# Patient Record
Sex: FEMALE | Race: WHITE | URBAN - NONMETROPOLITAN AREA
[De-identification: names, ages, dates, MRNs, and addresses within clinical notes are randomized per-mention and may not be internally consistent; named-entity substitution may affect disease eponyms.]

---

## 2022-10-27 ENCOUNTER — OFFICE VISIT (OUTPATIENT)
Dept: PRIMARY CARE CLINIC | Age: 52
End: 2022-10-27
Payer: COMMERCIAL

## 2022-10-27 VITALS
SYSTOLIC BLOOD PRESSURE: 122 MMHG | WEIGHT: 188.2 LBS | TEMPERATURE: 97.5 F | HEART RATE: 60 BPM | DIASTOLIC BLOOD PRESSURE: 72 MMHG | OXYGEN SATURATION: 98 % | RESPIRATION RATE: 18 BRPM

## 2022-10-27 DIAGNOSIS — Z11.52 ENCOUNTER FOR SCREENING FOR COVID-19: ICD-10-CM

## 2022-10-27 DIAGNOSIS — R11.0 NAUSEA: Primary | ICD-10-CM

## 2022-10-27 LAB
INFLUENZA A ANTIBODY: NEGATIVE
INFLUENZA B ANTIBODY: NEGATIVE
SARS-COV-2, PCR: NOT DETECTED

## 2022-10-27 PROCEDURE — 99212 OFFICE O/P EST SF 10 MIN: CPT | Performed by: NURSE PRACTITIONER

## 2022-10-27 PROCEDURE — 87804 INFLUENZA ASSAY W/OPTIC: CPT | Performed by: NURSE PRACTITIONER

## 2022-10-27 ASSESSMENT — ENCOUNTER SYMPTOMS
DIARRHEA: 0
VOMITING: 0
ALLERGIC/IMMUNOLOGIC NEGATIVE: 1
RESPIRATORY NEGATIVE: 1
EYES NEGATIVE: 1
NAUSEA: 1
SINUS PRESSURE: 0
SINUS PAIN: 0
SORE THROAT: 0

## 2022-10-27 NOTE — PROGRESS NOTES
Darío Garcia (:  1970) is a 47 y.o. female,Established patient, here for evaluation of the following chief complaint(s):  Fever and Nausea    Patient presents today complaining of fever and nausea, body aches, fatigue, chills, headache that began this morning. Denies cough, vomiting or diarrhea. Discussed with patient her flu test was negative. Will send covid test. Care instructions discussed. Patient verbalized understanding and agrees to plan of care. ASSESSMENT/PLAN:  1. Nausea  -     POCT Influenza A/B  2. Encounter for screening for COVID-19     No orders of the defined types were placed in this encounter. Return if symptoms worsen or fail to improve. Subjective   SUBJECTIVE/OBJECTIVE:  HPI    Review of Systems   Constitutional:  Positive for chills, fatigue and fever. HENT: Negative. Negative for congestion, ear discharge, ear pain, sinus pressure, sinus pain and sore throat. Eyes: Negative. Respiratory: Negative. Cardiovascular: Negative. Gastrointestinal:  Positive for nausea. Negative for diarrhea and vomiting. Endocrine: Negative. Genitourinary: Negative. Musculoskeletal:  Positive for myalgias. Skin: Negative. Allergic/Immunologic: Negative. Neurological:  Positive for headaches. Hematological: Negative. Psychiatric/Behavioral: Negative. Objective   Physical Exam  Vitals reviewed. HENT:      Right Ear: Tympanic membrane, ear canal and external ear normal.      Left Ear: Tympanic membrane, ear canal and external ear normal.      Nose:      Right Sinus: No maxillary sinus tenderness or frontal sinus tenderness. Left Sinus: No maxillary sinus tenderness or frontal sinus tenderness. Mouth/Throat:      Lips: Pink. Mouth: Mucous membranes are moist.      Pharynx: No oropharyngeal exudate or posterior oropharyngeal erythema. Cardiovascular:      Rate and Rhythm: Normal rate and regular rhythm.       Heart sounds: Normal heart sounds. Pulmonary:      Effort: Pulmonary effort is normal.      Breath sounds: Normal breath sounds. Abdominal:      General: Bowel sounds are normal.      Palpations: Abdomen is soft. Tenderness: There is no abdominal tenderness. There is no guarding. Lymphadenopathy:      Head:      Right side of head: No submandibular or tonsillar adenopathy. Left side of head: No submandibular or tonsillar adenopathy. Cervical:      Right cervical: No superficial cervical adenopathy. Left cervical: No superficial cervical adenopathy. Skin:     General: Skin is warm and dry. Neurological:      Mental Status: She is alert. Patient Instructions     Your rapid flu test today was negative. You will receive a phone call regarding your COVID-19 test results and results will be released to your [facility]. Follow-up with your PCP in 3 days if symptoms do not improve or if worsening; if symptoms suddenly change or worsen, including shortness of breath or difficulty swallowing, go to the emergency department. Patient verbalized understanding. Tylenol or Motrin for fever or pain as needed. Rest and increase fluid intake. Coolmist humidifier. May start Claritin or Zyrtec daily. May start Flonase daily. Gargle with warm salt water several times daily for sore throat. An electronic signature was used to authenticate this note. --GUY Daly - CNP     EMR Dragon/translation disclaimer: Much of this encounter note is an electronic transcription/translation of spoken language to printed text. The electronic translation of spoken language may be erroneous, or at times, nonsensical words or phrases may be inadvertently transcribed.   Although I have reviewed the note for such errors, some may still exist.

## 2022-10-27 NOTE — PATIENT INSTRUCTIONS
Your rapid flu test today was negative. You will receive a phone call regarding your COVID-19 test results and [facility] Follow-up with your PCP in 3 days if symptoms do not improve or if worsening; if symptoms suddenly change or worsen, including shortness of breath or difficulty swallowing, go to the emergency department. Patient verbalized understanding. Tylenol or Motrin for fever or pain as needed. Rest and increase fluid intake. Coolmist humidifier. May start Claritin or Zyrtec daily. May start Flonase daily. Gargle with warm salt water several times daily for sore throat.

## 2022-12-30 ENCOUNTER — OFFICE VISIT (OUTPATIENT)
Dept: PRIMARY CARE CLINIC | Age: 52
End: 2022-12-30
Payer: COMMERCIAL

## 2022-12-30 VITALS
DIASTOLIC BLOOD PRESSURE: 84 MMHG | TEMPERATURE: 98.8 F | SYSTOLIC BLOOD PRESSURE: 132 MMHG | HEART RATE: 87 BPM | WEIGHT: 186 LBS | OXYGEN SATURATION: 97 % | RESPIRATION RATE: 20 BRPM

## 2022-12-30 DIAGNOSIS — J06.9 VIRAL URI WITH COUGH: Primary | ICD-10-CM

## 2022-12-30 DIAGNOSIS — R11.2 NAUSEA AND VOMITING, UNSPECIFIED VOMITING TYPE: ICD-10-CM

## 2022-12-30 DIAGNOSIS — R52 BODY ACHES: ICD-10-CM

## 2022-12-30 DIAGNOSIS — Z11.52 ENCOUNTER FOR SCREENING FOR COVID-19: ICD-10-CM

## 2022-12-30 LAB
INFLUENZA A ANTIBODY: NORMAL
INFLUENZA B ANTIBODY: NORMAL
SARS-COV-2, PCR: DETECTED

## 2022-12-30 PROCEDURE — 99213 OFFICE O/P EST LOW 20 MIN: CPT | Performed by: NURSE PRACTITIONER

## 2022-12-30 PROCEDURE — 87804 INFLUENZA ASSAY W/OPTIC: CPT | Performed by: NURSE PRACTITIONER

## 2022-12-30 RX ORDER — ONDANSETRON 4 MG/1
8 TABLET, ORALLY DISINTEGRATING ORAL 3 TIMES DAILY PRN
Qty: 20 TABLET | Refills: 0 | Status: SHIPPED | OUTPATIENT
Start: 2022-12-30

## 2022-12-30 ASSESSMENT — ENCOUNTER SYMPTOMS
ABDOMINAL PAIN: 0
SHORTNESS OF BREATH: 0
COUGH: 1
WHEEZING: 0
SINUS PRESSURE: 1
ALLERGIC/IMMUNOLOGIC NEGATIVE: 1
SORE THROAT: 0
DIARRHEA: 0
NAUSEA: 1
VOMITING: 1
EYES NEGATIVE: 1

## 2022-12-30 NOTE — LETTER
[facility] ([facility]) [facility] [address]  Phone: [phone]  Fax: [phone]    GUY Soto CNP        December 30, 2022     Patient: Waldemar Salas   YOB: 1970   Date of Visit: 12/30/2022       To Whom it May Concern:    Waldemar Salas was seen in my clinic on 12/30/2022. She may return to work on 1/4/2023. If you have any questions or concerns, please don't hesitate to call.     Sincerely,         GUY Soto CNP

## 2022-12-30 NOTE — PATIENT INSTRUCTIONS
Increase fluid intake. Hydrating fluids such as water or juice. Avoid Gatorade if having diarrhea. Rest.    Tylenol or Motrin for fever or pain as needed. You are contagious for at least 5 days. Change toothbrush after 5 days. You may use a coolmist humidifier in the bedroom to help moisten the which is helpful for your throat and sinuses. May start Mucinex for congestion. Start Claritin or Zyrtec daily to help with sinus drainage. Start Flonase nasal spray daily. Eat a bland diet if experiencing nausea or vomiting. Watch for signs of dehydration. If you experience the symptoms go to the emergency department for fluid replacement. Signs and symptoms of dehydration may include and/or reasons to go to the emergency department:  - Feeling thirsty  - Dark or strong smelling urine  - Feeling dizzy or lightheaded  - Fatigue  - Dry mouth, lips or eyes  - Urinating small amounts or fewer than 4 times a day  - No urine in 8-12 hours. - Cracked lips. - Not making tears while crying.  - Dry mouth.  - Sunken eyes. - Sleepiness. - Weakness.  - Dry skin that does not flatten after being gently pinched. - Has blood in his or her vomit.  - Has vomit that looks like coffee grounds. - Has bloody or black stools or stools that look like tar. - Has a severe headache, a stiff neck, or both. - Has worsening pain in the abdomen.  - Has trouble breathing or is breathing very quickly. - Has a fast heartbeat. - Has skin that feels cold and clammy.  - Seems confused. Avoid antimotility agents (I.e. Imodium), as these may prolong symptoms. Information on BRAT diet provided.

## 2022-12-30 NOTE — PROGRESS NOTES
breath and wheezing. Cardiovascular: Negative. Gastrointestinal:  Positive for nausea and vomiting. Negative for abdominal pain and diarrhea. Endocrine: Negative. Genitourinary: Negative. Musculoskeletal:  Positive for myalgias. Skin: Negative. Allergic/Immunologic: Negative. Neurological:  Positive for headaches. Hematological: Negative. Psychiatric/Behavioral: Negative. Objective   Physical Exam  Vitals reviewed. HENT:      Right Ear: Tympanic membrane, ear canal and external ear normal.      Left Ear: Tympanic membrane, ear canal and external ear normal.      Nose:      Right Sinus: No maxillary sinus tenderness or frontal sinus tenderness. Left Sinus: No maxillary sinus tenderness or frontal sinus tenderness. Mouth/Throat:      Lips: Pink. Mouth: Mucous membranes are moist.      Pharynx: Posterior oropharyngeal erythema (postnasal drainage) present. No oropharyngeal exudate. Cardiovascular:      Rate and Rhythm: Normal rate and regular rhythm. Heart sounds: Normal heart sounds. Pulmonary:      Effort: Pulmonary effort is normal.      Breath sounds: Normal breath sounds. Abdominal:      General: Bowel sounds are normal.      Palpations: Abdomen is soft. Tenderness: There is no abdominal tenderness. There is no guarding. Lymphadenopathy:      Head:      Right side of head: No submandibular or tonsillar adenopathy. Left side of head: No submandibular or tonsillar adenopathy. Cervical:      Right cervical: No superficial cervical adenopathy. Left cervical: No superficial cervical adenopathy. Skin:     General: Skin is warm and dry. Neurological:      Mental Status: She is alert. Patient Instructions     Increase fluid intake. Hydrating fluids such as water or juice. Avoid Gatorade if having diarrhea. Rest.    Tylenol or Motrin for fever or pain as needed. You are contagious for at least 5 days.     Change toothbrush after 5 days. You may use a coolmist humidifier in the bedroom to help moisten the which is helpful for your throat and sinuses. May start Mucinex for congestion. Start Claritin or Zyrtec daily to help with sinus drainage. Start Flonase nasal spray daily. Eat a bland diet if experiencing nausea or vomiting. Watch for signs of dehydration. If you experience the symptoms go to the emergency department for fluid replacement. Signs and symptoms of dehydration may include and/or reasons to go to the emergency department:  - Feeling thirsty  - Dark or strong smelling urine  - Feeling dizzy or lightheaded  - Fatigue  - Dry mouth, lips or eyes  - Urinating small amounts or fewer than 4 times a day  - No urine in 8-12 hours. - Cracked lips. - Not making tears while crying.  - Dry mouth.  - Sunken eyes. - Sleepiness. - Weakness.  - Dry skin that does not flatten after being gently pinched. - Has blood in his or her vomit.  - Has vomit that looks like coffee grounds. - Has bloody or black stools or stools that look like tar. - Has a severe headache, a stiff neck, or both. - Has worsening pain in the abdomen.  - Has trouble breathing or is breathing very quickly. - Has a fast heartbeat. - Has skin that feels cold and clammy.  - Seems confused. Avoid antimotility agents (I.e. Imodium), as these may prolong symptoms. Information on BRAT diet provided. An electronic signature was used to authenticate this note. --GUY Varela - CNP     EMR Dragon/translation disclaimer: Much of this encounter note is an electronic transcription/translation of spoken language to printed text. The electronic translation of spoken language may be erroneous, or at times, nonsensical words or phrases may be inadvertently transcribed.   Although I have reviewed the note for such errors, some may still exist.

## 2023-03-29 ENCOUNTER — OFFICE VISIT (OUTPATIENT)
Dept: FAMILY MEDICINE CLINIC | Age: 53
End: 2023-03-29
Payer: COMMERCIAL

## 2023-03-29 VITALS
OXYGEN SATURATION: 97 % | WEIGHT: 194 LBS | HEIGHT: 65 IN | BODY MASS INDEX: 32.32 KG/M2 | DIASTOLIC BLOOD PRESSURE: 78 MMHG | SYSTOLIC BLOOD PRESSURE: 120 MMHG | HEART RATE: 72 BPM | TEMPERATURE: 98.1 F

## 2023-03-29 DIAGNOSIS — E55.9 VITAMIN D DEFICIENCY: ICD-10-CM

## 2023-03-29 DIAGNOSIS — R45.89 TEARFULNESS: ICD-10-CM

## 2023-03-29 DIAGNOSIS — Z76.89 ENCOUNTER TO ESTABLISH CARE: Primary | ICD-10-CM

## 2023-03-29 DIAGNOSIS — Z13.220 LIPID SCREENING: ICD-10-CM

## 2023-03-29 DIAGNOSIS — R45.1 AGITATION: ICD-10-CM

## 2023-03-29 DIAGNOSIS — F41.9 ANXIETY: ICD-10-CM

## 2023-03-29 LAB
25(OH)D3 SERPL-MCNC: 9.4 NG/ML
ALBUMIN SERPL-MCNC: 4.4 G/DL (ref 3.5–5.2)
ALP SERPL-CCNC: 112 U/L (ref 35–104)
ALT SERPL-CCNC: 26 U/L (ref 5–33)
ANION GAP SERPL CALCULATED.3IONS-SCNC: 12 MMOL/L (ref 7–19)
AST SERPL-CCNC: 19 U/L (ref 5–32)
BILIRUB SERPL-MCNC: 0.4 MG/DL (ref 0.2–1.2)
BUN SERPL-MCNC: 8 MG/DL (ref 6–20)
CALCIUM SERPL-MCNC: 9.1 MG/DL (ref 8.6–10)
CHLORIDE SERPL-SCNC: 105 MMOL/L (ref 98–111)
CHOLEST SERPL-MCNC: 195 MG/DL (ref 160–199)
CO2 SERPL-SCNC: 26 MMOL/L (ref 22–29)
CREAT SERPL-MCNC: 0.7 MG/DL (ref 0.5–0.9)
ERYTHROCYTE [DISTWIDTH] IN BLOOD BY AUTOMATED COUNT: 13.8 % (ref 11.5–14.5)
GLUCOSE SERPL-MCNC: 94 MG/DL (ref 74–109)
HBA1C MFR BLD: 5.8 % (ref 4–6)
HCT VFR BLD AUTO: 40.7 % (ref 37–47)
HDLC SERPL-MCNC: 41 MG/DL (ref 65–121)
HGB BLD-MCNC: 12.9 G/DL (ref 12–16)
LDLC SERPL CALC-MCNC: 117 MG/DL
MCH RBC QN AUTO: 28.6 PG (ref 27–31)
MCHC RBC AUTO-ENTMCNC: 31.7 G/DL (ref 33–37)
MCV RBC AUTO: 90.2 FL (ref 81–99)
PLATELET # BLD AUTO: 360 K/UL (ref 130–400)
PMV BLD AUTO: 9.6 FL (ref 9.4–12.3)
POTASSIUM SERPL-SCNC: 3.8 MMOL/L (ref 3.5–5)
PROT SERPL-MCNC: 8.2 G/DL (ref 6.6–8.7)
RBC # BLD AUTO: 4.51 M/UL (ref 4.2–5.4)
SODIUM SERPL-SCNC: 143 MMOL/L (ref 136–145)
TRIGL SERPL-MCNC: 185 MG/DL (ref 0–149)
TSH SERPL DL<=0.005 MIU/L-ACNC: 3.79 UIU/ML (ref 0.35–5.5)
VIT B12 SERPL-MCNC: 523 PG/ML (ref 211–946)
WBC # BLD AUTO: 8.2 K/UL (ref 4.8–10.8)

## 2023-03-29 PROCEDURE — 99204 OFFICE O/P NEW MOD 45 MIN: CPT | Performed by: NURSE PRACTITIONER

## 2023-03-29 RX ORDER — BUSPIRONE HYDROCHLORIDE 5 MG/1
5 TABLET ORAL 3 TIMES DAILY PRN
Qty: 90 TABLET | Refills: 1 | Status: SHIPPED | OUTPATIENT
Start: 2023-03-29 | End: 2023-04-28

## 2023-03-29 RX ORDER — BUPROPION HYDROCHLORIDE 75 MG/1
75 TABLET ORAL 2 TIMES DAILY
Qty: 60 TABLET | Refills: 3 | Status: SHIPPED | OUTPATIENT
Start: 2023-03-29

## 2023-03-29 ASSESSMENT — PATIENT HEALTH QUESTIONNAIRE - PHQ9
SUM OF ALL RESPONSES TO PHQ QUESTIONS 1-9: 0
2. FEELING DOWN, DEPRESSED OR HOPELESS: 0
1. LITTLE INTEREST OR PLEASURE IN DOING THINGS: 0
SUM OF ALL RESPONSES TO PHQ QUESTIONS 1-9: 0
SUM OF ALL RESPONSES TO PHQ9 QUESTIONS 1 & 2: 0

## 2023-03-29 ASSESSMENT — ENCOUNTER SYMPTOMS
EYES NEGATIVE: 1
GASTROINTESTINAL NEGATIVE: 1
RESPIRATORY NEGATIVE: 1

## 2023-03-29 NOTE — PROGRESS NOTES
No Known Allergies    Family History   Problem Relation Age of Onset    Cancer Father         lung    Breast Cancer Paternal Aunt          Subjective:      Review of Systems   Constitutional: Negative. HENT: Negative. Eyes: Negative. Respiratory: Negative. Cardiovascular: Negative. Gastrointestinal: Negative. Endocrine: Negative. Genitourinary: Negative. Musculoskeletal: Negative. Skin: Negative. Neurological: Negative. Hematological: Negative. Psychiatric/Behavioral:  Positive for sleep disturbance. The patient is nervous/anxious. Objective:     Physical Exam  Vitals and nursing note reviewed. Constitutional:       Appearance: Normal appearance. She is well-developed. She is obese. HENT:      Head: Normocephalic and atraumatic. Right Ear: Hearing, tympanic membrane, ear canal and external ear normal.      Left Ear: Hearing, tympanic membrane, ear canal and external ear normal.      Nose: Nose normal.      Mouth/Throat:      Lips: No lesions. Pharynx: Oropharynx is clear. Uvula midline. Eyes:      General: Lids are normal.      Extraocular Movements: Extraocular movements intact. Conjunctiva/sclera: Conjunctivae normal.      Pupils: Pupils are equal, round, and reactive to light. Neck:      Thyroid: No thyroid mass or thyromegaly. Trachea: Trachea normal.   Cardiovascular:      Rate and Rhythm: Normal rate and regular rhythm. Heart sounds: Normal heart sounds. Pulmonary:      Effort: Pulmonary effort is normal.      Breath sounds: Normal breath sounds. Abdominal:      General: Bowel sounds are normal.      Palpations: Abdomen is soft. Musculoskeletal:         General: Normal range of motion. Cervical back: Normal, normal range of motion and neck supple. No tenderness. Thoracic back: Normal. No tenderness. Normal range of motion. Lumbar back: Normal. No tenderness. Normal range of motion.    Skin:     General: Skin

## 2023-03-30 ENCOUNTER — TELEPHONE (OUTPATIENT)
Dept: FAMILY MEDICINE CLINIC | Age: 53
End: 2023-03-30

## 2023-03-30 RX ORDER — ERGOCALCIFEROL 1.25 MG/1
50000 CAPSULE ORAL WEEKLY
Qty: 4 CAPSULE | Refills: 3 | Status: SHIPPED | OUTPATIENT
Start: 2023-03-30

## 2023-03-30 NOTE — TELEPHONE ENCOUNTER
----- Message from GUY Jackson sent at 3/30/2023  8:52 AM CDT -----  Please let patient know that labs have returned. Vit d was too low at 9.4,  this needs to be greater than 30! Artem Valente I am sending in 50,000 units once weekly for 12 weeks. All other labs were stable. Thanks.

## 2023-03-30 NOTE — TELEPHONE ENCOUNTER
Called patient, spoke with: Patient regarding the results of the patients most recent Labs. I advised Patient of Dalila Palma recommendations.    Patient did voice understanding

## 2023-04-26 ENCOUNTER — OFFICE VISIT (OUTPATIENT)
Dept: FAMILY MEDICINE CLINIC | Age: 53
End: 2023-04-26
Payer: COMMERCIAL

## 2023-04-26 VITALS
DIASTOLIC BLOOD PRESSURE: 86 MMHG | OXYGEN SATURATION: 98 % | HEART RATE: 78 BPM | BODY MASS INDEX: 31.99 KG/M2 | HEIGHT: 65 IN | TEMPERATURE: 97.5 F | WEIGHT: 192 LBS | SYSTOLIC BLOOD PRESSURE: 120 MMHG

## 2023-04-26 DIAGNOSIS — E55.9 VITAMIN D DEFICIENCY: ICD-10-CM

## 2023-04-26 DIAGNOSIS — R45.1 AGITATION: ICD-10-CM

## 2023-04-26 DIAGNOSIS — F41.9 ANXIETY: Primary | ICD-10-CM

## 2023-04-26 PROCEDURE — 99214 OFFICE O/P EST MOD 30 MIN: CPT | Performed by: NURSE PRACTITIONER

## 2023-04-26 RX ORDER — BUPROPION HYDROCHLORIDE 150 MG/1
150 TABLET ORAL EVERY MORNING
Qty: 30 TABLET | Refills: 3 | Status: SHIPPED | OUTPATIENT
Start: 2023-04-26

## 2023-04-26 ASSESSMENT — ENCOUNTER SYMPTOMS
RESPIRATORY NEGATIVE: 1
EYES NEGATIVE: 1
GASTROINTESTINAL NEGATIVE: 1

## 2023-04-26 NOTE — PROGRESS NOTES
[facility]  [facility]  [place]  [address] [address]  Dept: [phone]  Dept Fax: [phone]  Loc: [phone]    Soni Ruiz is a 46 y.o. female who presents today for her medical conditions/complaints as noted below. Soni Ruiz is c/o of Follow-up Chronic Condition      Chief Complaint   Patient presents with    Follow-up Chronic Condition       HPI:     HPI  Patient presents today for routine follow up of chronic conditions. She states that for the first couple of weeks her \"shaking\" was worse, but this has since subsided. Otherwise, medications are working well for her. She has been taking the vit d supplement that was sent in as well. Past Medical History:   Diagnosis Date    Anxiety     Panic disorder         Past Surgical History:   Procedure Laterality Date    TUBAL LIGATION         Social History     Tobacco Use    Smoking status: Former     Packs/day: 1.00     Years: 25.00     Pack years: 25.00     Types: Cigarettes    Smokeless tobacco: Never   Substance Use Topics    Alcohol use: Never        Current Outpatient Medications   Medication Sig Dispense Refill    buPROPion (WELLBUTRIN XL) 150 MG extended release tablet Take 1 tablet by mouth every morning 30 tablet 3    ergocalciferol (ERGOCALCIFEROL) 1.25 MG (16172 UT) capsule Take 1 capsule by mouth once a week 4 capsule 3    busPIRone (BUSPAR) 5 MG tablet Take 1 tablet by mouth 3 times daily as needed (anxiety) 90 tablet 1     No current facility-administered medications for this visit. No Known Allergies    Family History   Problem Relation Age of Onset    Cancer Father         lung    Breast Cancer Paternal Aunt                Subjective:      Review of Systems   Constitutional: Negative. HENT: Negative. Eyes: Negative. Respiratory: Negative. Cardiovascular: Negative. Gastrointestinal: Negative. Endocrine: Negative. Genitourinary: Negative.     Musculoskeletal:

## 2023-06-28 DIAGNOSIS — F41.9 ANXIETY: ICD-10-CM

## 2023-06-28 DIAGNOSIS — R45.1 AGITATION: ICD-10-CM

## 2023-06-28 RX ORDER — BUPROPION HYDROCHLORIDE 150 MG/1
150 TABLET ORAL EVERY MORNING
Qty: 90 TABLET | Refills: 3 | Status: SHIPPED | OUTPATIENT
Start: 2023-06-28

## 2023-07-13 RX ORDER — ERGOCALCIFEROL 1.25 MG/1
CAPSULE ORAL
Qty: 12 CAPSULE | Refills: 1 | Status: SHIPPED | OUTPATIENT
Start: 2023-07-13

## 2023-07-13 NOTE — TELEPHONE ENCOUNTER
Hui Dennison called requesting a refill of the below medication which has been pended for you:     Requested Prescriptions     Pending Prescriptions Disp Refills    vitamin D (ERGOCALCIFEROL) 1.25 MG (37471 UT) CAPS capsule [Pharmacy Med Name: VITAMIN D2 1.25MG(50,000 UNIT)] 12 capsule 1     Sig: TAKE 1 CAPSULE BY MOUTH ONE TIME PER WEEK       Last Appointment Date: 4/26/2023  Next Appointment Date: 7/26/2023    No Known Allergies

## 2023-07-26 ENCOUNTER — OFFICE VISIT (OUTPATIENT)
Dept: FAMILY MEDICINE CLINIC | Age: 53
End: 2023-07-26
Payer: COMMERCIAL

## 2023-07-26 VITALS
SYSTOLIC BLOOD PRESSURE: 122 MMHG | OXYGEN SATURATION: 100 % | TEMPERATURE: 98.1 F | BODY MASS INDEX: 31.82 KG/M2 | HEIGHT: 65 IN | WEIGHT: 191 LBS | HEART RATE: 80 BPM | DIASTOLIC BLOOD PRESSURE: 76 MMHG

## 2023-07-26 DIAGNOSIS — E55.9 VITAMIN D DEFICIENCY: ICD-10-CM

## 2023-07-26 DIAGNOSIS — F41.9 ANXIETY: Primary | ICD-10-CM

## 2023-07-26 DIAGNOSIS — R45.1 AGITATION: ICD-10-CM

## 2023-07-26 PROCEDURE — 99214 OFFICE O/P EST MOD 30 MIN: CPT | Performed by: NURSE PRACTITIONER

## 2023-07-26 RX ORDER — BUPROPION HYDROCHLORIDE 300 MG/1
300 TABLET ORAL EVERY MORNING
Qty: 90 TABLET | Refills: 2 | Status: SHIPPED | OUTPATIENT
Start: 2023-07-26

## 2023-07-26 ASSESSMENT — ENCOUNTER SYMPTOMS
EYES NEGATIVE: 1
RESPIRATORY NEGATIVE: 1
GASTROINTESTINAL NEGATIVE: 1

## 2023-08-10 ENCOUNTER — OFFICE VISIT (OUTPATIENT)
Dept: FAMILY MEDICINE CLINIC | Age: 53
End: 2023-08-10
Payer: COMMERCIAL

## 2023-08-10 VITALS
TEMPERATURE: 97.6 F | HEIGHT: 65 IN | WEIGHT: 189 LBS | SYSTOLIC BLOOD PRESSURE: 128 MMHG | OXYGEN SATURATION: 100 % | HEART RATE: 88 BPM | DIASTOLIC BLOOD PRESSURE: 80 MMHG | BODY MASS INDEX: 31.49 KG/M2

## 2023-08-10 DIAGNOSIS — R50.9 FEVER, UNSPECIFIED FEVER CAUSE: Primary | ICD-10-CM

## 2023-08-10 DIAGNOSIS — R50.9 FEVER, UNSPECIFIED FEVER CAUSE: ICD-10-CM

## 2023-08-10 DIAGNOSIS — R11.0 NAUSEA: ICD-10-CM

## 2023-08-10 DIAGNOSIS — R68.83 CHILLS: ICD-10-CM

## 2023-08-10 LAB
B PARAP IS1001 DNA NPH QL NAA+NON-PROBE: NOT DETECTED
B PERT.PT PRMT NPH QL NAA+NON-PROBE: NOT DETECTED
C PNEUM DNA NPH QL NAA+NON-PROBE: NOT DETECTED
FLUAV RNA NPH QL NAA+NON-PROBE: NOT DETECTED
FLUBV RNA NPH QL NAA+NON-PROBE: NOT DETECTED
HADV DNA NPH QL NAA+NON-PROBE: NOT DETECTED
HCOV 229E RNA NPH QL NAA+NON-PROBE: NOT DETECTED
HCOV HKU1 RNA NPH QL NAA+NON-PROBE: NOT DETECTED
HCOV NL63 RNA NPH QL NAA+NON-PROBE: NOT DETECTED
HCOV OC43 RNA NPH QL NAA+NON-PROBE: NOT DETECTED
HMPV RNA NPH QL NAA+NON-PROBE: NOT DETECTED
HPIV1 RNA NPH QL NAA+NON-PROBE: NOT DETECTED
HPIV2 RNA NPH QL NAA+NON-PROBE: NOT DETECTED
HPIV3 RNA NPH QL NAA+NON-PROBE: NOT DETECTED
HPIV4 RNA NPH QL NAA+NON-PROBE: NOT DETECTED
M PNEUMO DNA NPH QL NAA+NON-PROBE: NOT DETECTED
RSV RNA NPH QL NAA+NON-PROBE: NOT DETECTED
RV+EV RNA NPH QL NAA+NON-PROBE: NOT DETECTED
SARS-COV-2 RNA NPH QL NAA+NON-PROBE: NOT DETECTED

## 2023-08-10 PROCEDURE — 99214 OFFICE O/P EST MOD 30 MIN: CPT | Performed by: NURSE PRACTITIONER

## 2023-08-10 RX ORDER — ONDANSETRON 4 MG/1
4 TABLET, ORALLY DISINTEGRATING ORAL 3 TIMES DAILY PRN
Qty: 21 TABLET | Refills: 0 | Status: SHIPPED | OUTPATIENT
Start: 2023-08-10

## 2023-08-10 ASSESSMENT — ENCOUNTER SYMPTOMS: SINUS PRESSURE: 1

## 2023-08-10 NOTE — PROGRESS NOTES
[facility]  [facility] [place]  [address] [address]  Dept: [phone]  Dept Fax: [phone]  Loc: [phone]    Mayo Flanagan is a 46 y.o. female who presents today for her medical conditions/complaints as noted below. Mayo Flanagan is c/o of Headache, Nausea, and Chills      Chief Complaint   Patient presents with    Headache    Nausea    Chills       HPI:     HPI  Patient presents today with complaints of chills, nausea, and headache. Symptoms started a couple of days ago. She has not been taking any otc medications. She does work with the public. She had temperature of 101 last night. She has not had any vomiting at all, but a lot of nausea. Past Medical History:   Diagnosis Date    Anxiety     Panic disorder         Past Surgical History:   Procedure Laterality Date    TUBAL LIGATION         Social History     Tobacco Use    Smoking status: Former     Packs/day: 1.00     Years: 25.00     Pack years: 25.00     Types: Cigarettes    Smokeless tobacco: Never   Substance Use Topics    Alcohol use: Never        Current Outpatient Medications   Medication Sig Dispense Refill    ondansetron (ZOFRAN-ODT) 4 MG disintegrating tablet Take 1 tablet by mouth 3 times daily as needed for Nausea or Vomiting 21 tablet 0    buPROPion (WELLBUTRIN XL) 300 MG extended release tablet Take 1 tablet by mouth every morning 90 tablet 2    vitamin D (ERGOCALCIFEROL) 1.25 MG (40021 UT) CAPS capsule TAKE 1 CAPSULE BY MOUTH ONE TIME PER WEEK 12 capsule 1     No current facility-administered medications for this visit. No Known Allergies    Family History   Problem Relation Age of Onset    Cancer Father         lung    Breast Cancer Paternal Aunt          Subjective:      Review of Systems   Constitutional:  Positive for chills, fatigue and fever. HENT:  Positive for sinus pressure. Neurological:  Positive for headaches.      Objective:     Physical Exam  Vitals and

## 2023-08-11 ENCOUNTER — TELEPHONE (OUTPATIENT)
Dept: FAMILY MEDICINE CLINIC | Age: 53
End: 2023-08-11

## 2023-08-11 NOTE — TELEPHONE ENCOUNTER
----- Message from GUY Carpenter sent at 8/11/2023  9:13 AM CDT -----  Please let patient know that viral panel was negative.   Symptomatic care for now

## 2023-09-06 ENCOUNTER — OFFICE VISIT (OUTPATIENT)
Dept: FAMILY MEDICINE CLINIC | Age: 53
End: 2023-09-06
Payer: COMMERCIAL

## 2023-09-06 VITALS
TEMPERATURE: 98 F | HEART RATE: 62 BPM | BODY MASS INDEX: 31.65 KG/M2 | OXYGEN SATURATION: 98 % | SYSTOLIC BLOOD PRESSURE: 126 MMHG | HEIGHT: 65 IN | WEIGHT: 190 LBS | DIASTOLIC BLOOD PRESSURE: 82 MMHG

## 2023-09-06 DIAGNOSIS — F41.9 ANXIETY: ICD-10-CM

## 2023-09-06 PROCEDURE — 99213 OFFICE O/P EST LOW 20 MIN: CPT | Performed by: NURSE PRACTITIONER

## 2023-09-06 RX ORDER — BUSPIRONE HYDROCHLORIDE 10 MG/1
10 TABLET ORAL 3 TIMES DAILY PRN
Qty: 90 TABLET | Refills: 1 | Status: SHIPPED | OUTPATIENT
Start: 2023-09-06 | End: 2023-10-06

## 2023-09-06 ASSESSMENT — ENCOUNTER SYMPTOMS
RESPIRATORY NEGATIVE: 1
GASTROINTESTINAL NEGATIVE: 1
EYES NEGATIVE: 1

## 2023-09-18 ENCOUNTER — OFFICE VISIT (OUTPATIENT)
Dept: PRIMARY CARE CLINIC | Age: 53
End: 2023-09-18
Payer: COMMERCIAL

## 2023-09-18 ENCOUNTER — TELEPHONE (OUTPATIENT)
Dept: FAMILY MEDICINE CLINIC | Age: 53
End: 2023-09-18

## 2023-09-18 VITALS
DIASTOLIC BLOOD PRESSURE: 62 MMHG | HEART RATE: 72 BPM | BODY MASS INDEX: 33.12 KG/M2 | TEMPERATURE: 97.7 F | RESPIRATION RATE: 16 BRPM | SYSTOLIC BLOOD PRESSURE: 120 MMHG | OXYGEN SATURATION: 100 % | WEIGHT: 199 LBS

## 2023-09-18 DIAGNOSIS — K08.89 PAIN OF TOOTH SOCKET: ICD-10-CM

## 2023-09-18 DIAGNOSIS — K05.10 GINGIVITIS: Primary | ICD-10-CM

## 2023-09-18 PROCEDURE — 99213 OFFICE O/P EST LOW 20 MIN: CPT | Performed by: NURSE PRACTITIONER

## 2023-09-18 RX ORDER — AMOXICILLIN AND CLAVULANATE POTASSIUM 875; 125 MG/1; MG/1
1 TABLET, FILM COATED ORAL 2 TIMES DAILY
Qty: 20 TABLET | Refills: 0 | Status: SHIPPED | OUTPATIENT
Start: 2023-09-18 | End: 2023-09-28

## 2023-09-18 ASSESSMENT — ENCOUNTER SYMPTOMS
COUGH: 0
EYE ITCHING: 0
BLOOD IN STOOL: 0
EYE DISCHARGE: 0
DIARRHEA: 0
SINUS PRESSURE: 0
CONSTIPATION: 0
WHEEZING: 0
COLOR CHANGE: 0
ABDOMINAL PAIN: 0
RHINORRHEA: 0
SHORTNESS OF BREATH: 0
NAUSEA: 0
SORE THROAT: 0
VOMITING: 0

## 2023-09-18 NOTE — PROGRESS NOTES
[facility]  [address] [address]  Dept: [phone]  Dept Fax: [phone]  Loc: [phone]    Cb Greenfield is a 46 y.o. female who presents today for her medical conditions/complaints as noted below. Cb Greenfield is complaining of Oral Swelling, Jaw Pain, and Facial Swelling        HPI:   Right lower tooth pain that radiates to her jaw and right ear    Dental Pain   This is a new problem. The current episode started yesterday. The problem occurs constantly. The problem has been gradually worsening. The pain is moderate. Associated symptoms include facial pain. Pertinent negatives include no fever or sinus pressure. She has tried nothing for the symptoms.        Past Medical History:   Diagnosis Date    Anxiety     Panic disorder        Past Surgical History:   Procedure Laterality Date    TUBAL LIGATION         Family History   Problem Relation Age of Onset    Cancer Father         lung    Breast Cancer Paternal Aunt        Social History     Tobacco Use    Smoking status: Former     Packs/day: 1.00     Years: 25.00     Additional pack years: 0.00     Total pack years: 25.00     Types: Cigarettes    Smokeless tobacco: Never   Substance Use Topics    Alcohol use: Never        Current Outpatient Medications   Medication Sig Dispense Refill    amoxicillin-clavulanate (AUGMENTIN) 875-125 MG per tablet Take 1 tablet by mouth 2 times daily for 10 days 20 tablet 0    busPIRone (BUSPAR) 10 MG tablet Take 1 tablet by mouth 3 times daily as needed (anxiety) 90 tablet 1    buPROPion (WELLBUTRIN XL) 300 MG extended release tablet Take 1 tablet by mouth every morning 90 tablet 2    vitamin D (ERGOCALCIFEROL) 1.25 MG (10649 UT) CAPS capsule TAKE 1 CAPSULE BY MOUTH ONE TIME PER WEEK 12 capsule 1    ondansetron (ZOFRAN-ODT) 4 MG disintegrating tablet Take 1 tablet by mouth 3 times daily as needed for Nausea or Vomiting (Patient not taking:

## 2023-09-18 NOTE — PATIENT INSTRUCTIONS
-Take full course of antibiotics. -Tylenol or motrin for pain control and fever as needed  -May use orajel as needed  -May use ice to the area as needed for swelling  -F/U with dentist as scheduled.

## 2023-09-18 NOTE — TELEPHONE ENCOUNTER
Pt called stating that her face is swollen on one side and it really hurts and is making her ear hurt as well. She believes it is a dental abscess. Advised pt that we do not have any openings today and that she should utilize the Walk in clinic. Pt understood.

## 2023-11-08 DIAGNOSIS — F41.9 ANXIETY: ICD-10-CM

## 2023-11-08 RX ORDER — BUSPIRONE HYDROCHLORIDE 10 MG/1
10 TABLET ORAL 3 TIMES DAILY PRN
Qty: 90 TABLET | Refills: 1 | Status: SHIPPED | OUTPATIENT
Start: 2023-11-08 | End: 2023-12-08

## 2023-11-08 NOTE — TELEPHONE ENCOUNTER
Bella Velasquez called requesting a refill of the below medication which has been pended for you:     Requested Prescriptions     Pending Prescriptions Disp Refills    busPIRone (BUSPAR) 10 MG tablet [Pharmacy Med Name: BUSPIRONE HCL 10 MG TABLET] 90 tablet 1     Sig: TAKE 1 TABLET BY MOUTH 3 TIMES DAILY AS NEEDED (ANXIETY).        Last Appointment Date: 9/6/2023  Next Appointment Date: 12/6/2023    No Known Allergies

## 2023-12-11 SDOH — ECONOMIC STABILITY: HOUSING INSECURITY
IN THE LAST 12 MONTHS, WAS THERE A TIME WHEN YOU DID NOT HAVE A STEADY PLACE TO SLEEP OR SLEPT IN A SHELTER (INCLUDING NOW)?: NO

## 2023-12-11 SDOH — ECONOMIC STABILITY: FOOD INSECURITY: WITHIN THE PAST 12 MONTHS, THE FOOD YOU BOUGHT JUST DIDN'T LAST AND YOU DIDN'T HAVE MONEY TO GET MORE.: SOMETIMES TRUE

## 2023-12-11 SDOH — ECONOMIC STABILITY: TRANSPORTATION INSECURITY
IN THE PAST 12 MONTHS, HAS LACK OF TRANSPORTATION KEPT YOU FROM MEETINGS, WORK, OR FROM GETTING THINGS NEEDED FOR DAILY LIVING?: NO

## 2023-12-11 SDOH — ECONOMIC STABILITY: INCOME INSECURITY: HOW HARD IS IT FOR YOU TO PAY FOR THE VERY BASICS LIKE FOOD, HOUSING, MEDICAL CARE, AND HEATING?: NOT VERY HARD

## 2023-12-11 SDOH — ECONOMIC STABILITY: FOOD INSECURITY: WITHIN THE PAST 12 MONTHS, YOU WORRIED THAT YOUR FOOD WOULD RUN OUT BEFORE YOU GOT MONEY TO BUY MORE.: SOMETIMES TRUE

## 2023-12-14 ENCOUNTER — OFFICE VISIT (OUTPATIENT)
Dept: FAMILY MEDICINE CLINIC | Age: 53
End: 2023-12-14
Payer: COMMERCIAL

## 2023-12-14 ENCOUNTER — TELEPHONE (OUTPATIENT)
Dept: FAMILY MEDICINE CLINIC | Age: 53
End: 2023-12-14

## 2023-12-14 VITALS
DIASTOLIC BLOOD PRESSURE: 80 MMHG | WEIGHT: 196 LBS | HEIGHT: 65 IN | SYSTOLIC BLOOD PRESSURE: 122 MMHG | OXYGEN SATURATION: 100 % | BODY MASS INDEX: 32.65 KG/M2 | TEMPERATURE: 98.2 F | HEART RATE: 70 BPM

## 2023-12-14 DIAGNOSIS — F41.9 ANXIETY: ICD-10-CM

## 2023-12-14 DIAGNOSIS — E55.9 VITAMIN D DEFICIENCY: ICD-10-CM

## 2023-12-14 DIAGNOSIS — F41.9 ANXIETY: Primary | ICD-10-CM

## 2023-12-14 DIAGNOSIS — Z12.31 ENCOUNTER FOR SCREENING MAMMOGRAM FOR MALIGNANT NEOPLASM OF BREAST: ICD-10-CM

## 2023-12-14 LAB
25(OH)D3 SERPL-MCNC: 40.7 NG/ML
ALBUMIN SERPL-MCNC: 4.4 G/DL (ref 3.5–5.2)
ALP SERPL-CCNC: 98 U/L (ref 35–104)
ALT SERPL-CCNC: 25 U/L (ref 5–33)
ANION GAP SERPL CALCULATED.3IONS-SCNC: 10 MMOL/L (ref 7–19)
AST SERPL-CCNC: 17 U/L (ref 5–32)
BASOPHILS # BLD: 0.1 K/UL (ref 0–0.2)
BASOPHILS NFR BLD: 0.7 % (ref 0–1)
BILIRUB SERPL-MCNC: <0.2 MG/DL (ref 0.2–1.2)
BUN SERPL-MCNC: 9 MG/DL (ref 6–20)
CALCIUM SERPL-MCNC: 9.3 MG/DL (ref 8.6–10)
CHLORIDE SERPL-SCNC: 103 MMOL/L (ref 98–111)
CO2 SERPL-SCNC: 27 MMOL/L (ref 22–29)
CREAT SERPL-MCNC: 0.7 MG/DL (ref 0.5–0.9)
EOSINOPHIL # BLD: 0.2 K/UL (ref 0–0.6)
EOSINOPHIL NFR BLD: 2.4 % (ref 0–5)
ERYTHROCYTE [DISTWIDTH] IN BLOOD BY AUTOMATED COUNT: 13.4 % (ref 11.5–14.5)
GLUCOSE SERPL-MCNC: 94 MG/DL (ref 74–109)
HCT VFR BLD AUTO: 41.5 % (ref 37–47)
HGB BLD-MCNC: 13 G/DL (ref 12–16)
IMM GRANULOCYTES # BLD: 0 K/UL
LYMPHOCYTES # BLD: 2.3 K/UL (ref 1.1–4.5)
LYMPHOCYTES NFR BLD: 27.3 % (ref 20–40)
MCH RBC QN AUTO: 29.1 PG (ref 27–31)
MCHC RBC AUTO-ENTMCNC: 31.3 G/DL (ref 33–37)
MCV RBC AUTO: 93 FL (ref 81–99)
MONOCYTES # BLD: 0.7 K/UL (ref 0–0.9)
MONOCYTES NFR BLD: 8 % (ref 0–10)
NEUTROPHILS # BLD: 5.1 K/UL (ref 1.5–7.5)
NEUTS SEG NFR BLD: 61.1 % (ref 50–65)
PLATELET # BLD AUTO: 383 K/UL (ref 130–400)
PMV BLD AUTO: 9.4 FL (ref 9.4–12.3)
POTASSIUM SERPL-SCNC: 3.8 MMOL/L (ref 3.5–5)
PROT SERPL-MCNC: 7.9 G/DL (ref 6.6–8.7)
RBC # BLD AUTO: 4.46 M/UL (ref 4.2–5.4)
SODIUM SERPL-SCNC: 140 MMOL/L (ref 136–145)
WBC # BLD AUTO: 8.4 K/UL (ref 4.8–10.8)

## 2023-12-14 PROCEDURE — 99214 OFFICE O/P EST MOD 30 MIN: CPT | Performed by: NURSE PRACTITIONER

## 2023-12-14 NOTE — TELEPHONE ENCOUNTER
Called patient, spoke with: Patient regarding the results of the patients most recent labs. I advised Patient of Matias Bagley recommendations.    Patient did voice understanding

## 2023-12-14 NOTE — TELEPHONE ENCOUNTER
----- Message from GUY Moseley sent at 12/14/2023  4:11 PM CST -----  Please let patient know that labs have returned. CBC was stable from last check. Vitamin D level has improved significantly. Okay to stop once weekly replacement and start just over-the-counter multivitamin. CMP was good as well.

## 2023-12-14 NOTE — PROGRESS NOTES
Eyes: Negative. Respiratory: Negative. Cardiovascular: Negative. Gastrointestinal: Negative. Endocrine: Negative. Genitourinary: Negative. Musculoskeletal: Negative. Skin: Negative. Neurological: Negative. Hematological: Negative. Psychiatric/Behavioral: Negative. Objective:     Physical Exam  Vitals and nursing note reviewed. Constitutional:       Appearance: Normal appearance. HENT:      Head: Normocephalic and atraumatic. Jaw: There is normal jaw occlusion. Right Ear: Hearing, tympanic membrane, ear canal and external ear normal.      Left Ear: Hearing, tympanic membrane, ear canal and external ear normal.      Nose: Nose normal.      Mouth/Throat:      Lips: Pink. Mouth: Mucous membranes are moist.      Pharynx: Oropharynx is clear. Eyes:      General: Lids are normal.      Extraocular Movements: Extraocular movements intact. Conjunctiva/sclera: Conjunctivae normal.      Pupils: Pupils are equal, round, and reactive to light. Neck:      Thyroid: No thyromegaly. Trachea: Trachea normal.   Cardiovascular:      Rate and Rhythm: Normal rate and regular rhythm. Pulses: Normal pulses. Dorsalis pedis pulses are 2+ on the right side and 2+ on the left side. Posterior tibial pulses are 2+ on the right side and 2+ on the left side. Heart sounds: Normal heart sounds. No murmur heard. Pulmonary:      Effort: Pulmonary effort is normal.      Breath sounds: Normal breath sounds and air entry. Abdominal:      General: Bowel sounds are normal.      Palpations: Abdomen is soft. Musculoskeletal:         General: Normal range of motion. Cervical back: Full passive range of motion without pain, normal range of motion and neck supple. Thoracic back: Normal range of motion. Lumbar back: Normal range of motion. Right lower leg: No edema. Left lower leg: No edema.    Lymphadenopathy:      Cervical: No

## 2024-01-12 DIAGNOSIS — F41.9 ANXIETY: ICD-10-CM

## 2024-01-12 RX ORDER — BUSPIRONE HYDROCHLORIDE 10 MG/1
10 TABLET ORAL 3 TIMES DAILY PRN
Qty: 90 TABLET | Refills: 1 | Status: SHIPPED | OUTPATIENT
Start: 2024-01-12 | End: 2024-04-11

## 2024-01-12 NOTE — TELEPHONE ENCOUNTER
Received fax from pharmacy requesting refill on pts medication(s). Pt was last seen in office on 12/14/2023  and has a follow up scheduled for 4/16/2024. Will send request to  Shania Haque  for authorization.     Requested Prescriptions     Pending Prescriptions Disp Refills    busPIRone (BUSPAR) 10 MG tablet [Pharmacy Med Name: BUSPIRONE HCL 10 MG TABLET] 90 tablet 1     Sig: Take 1 tablet by mouth 3 times daily as needed (anxiety)

## 2024-02-09 DIAGNOSIS — F41.9 ANXIETY: ICD-10-CM

## 2024-02-09 RX ORDER — BUSPIRONE HYDROCHLORIDE 10 MG/1
10 TABLET ORAL 3 TIMES DAILY PRN
Qty: 270 TABLET | Refills: 1 | Status: SHIPPED | OUTPATIENT
Start: 2024-02-09

## 2024-02-09 NOTE — TELEPHONE ENCOUNTER
Fax from pharmacy needing a 90 day rx    Received fax from pharmacy requesting refill on pts medication(s). Pt was last seen in office on 12/14/2023  and has a follow up scheduled for 4/16/2024. Will send request to  Shania Haque  for patient.     Requested Prescriptions     Pending Prescriptions Disp Refills    busPIRone (BUSPAR) 10 MG tablet 270 tablet 1     Sig: Take 1 tablet by mouth 3 times daily as needed (anxiety)

## 2024-02-23 ENCOUNTER — HOSPITAL ENCOUNTER (OUTPATIENT)
Dept: GENERAL RADIOLOGY | Age: 54
Discharge: HOME | End: 2024-02-23
Payer: COMMERCIAL

## 2024-02-23 ENCOUNTER — OFFICE VISIT (OUTPATIENT)
Dept: FAMILY MEDICINE CLINIC | Age: 54
End: 2024-02-23
Payer: COMMERCIAL

## 2024-02-23 VITALS
OXYGEN SATURATION: 98 % | HEIGHT: 65 IN | BODY MASS INDEX: 32.65 KG/M2 | WEIGHT: 196 LBS | TEMPERATURE: 97 F | HEART RATE: 78 BPM | SYSTOLIC BLOOD PRESSURE: 122 MMHG | DIASTOLIC BLOOD PRESSURE: 80 MMHG

## 2024-02-23 DIAGNOSIS — M24.811 CREPITUS OF RIGHT SHOULDER JOINT: ICD-10-CM

## 2024-02-23 DIAGNOSIS — M25.511 ACUTE PAIN OF RIGHT SHOULDER: ICD-10-CM

## 2024-02-23 DIAGNOSIS — M25.511 ACUTE PAIN OF RIGHT SHOULDER: Primary | ICD-10-CM
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
## 2024-02-23 PROCEDURE — 99213 OFFICE O/P EST LOW 20 MIN: CPT | Performed by: NURSE PRACTITIONER

## 2024-02-23 PROCEDURE — 73030 X-RAY EXAM OF SHOULDER: CPT

## 2024-02-23 RX ORDER — METHYLPREDNISOLONE 4 MG/1
TABLET ORAL
Qty: 1 KIT | Refills: 0 | Status: SHIPPED | OUTPATIENT
Start: 2024-02-23

## 2024-02-23 ASSESSMENT — PATIENT HEALTH QUESTIONNAIRE - PHQ9
1. LITTLE INTEREST OR PLEASURE IN DOING THINGS: 0
SUM OF ALL RESPONSES TO PHQ QUESTIONS 1-9: 0
SUM OF ALL RESPONSES TO PHQ9 QUESTIONS 1 & 2: 0
2. FEELING DOWN, DEPRESSED OR HOPELESS: 0
SUM OF ALL RESPONSES TO PHQ QUESTIONS 1-9: 0

## 2024-02-23 ASSESSMENT — ENCOUNTER SYMPTOMS
GASTROINTESTINAL NEGATIVE: 1
RESPIRATORY NEGATIVE: 1
EYES NEGATIVE: 1

## 2024-02-23 NOTE — PROGRESS NOTES
Capillary Refill: Capillary refill takes less than 2 seconds.   Neurological:      General: No focal deficit present.      Mental Status: She is alert and oriented to person, place, and time. Mental status is at baseline.      Coordination: Coordination is intact.   Psychiatric:         Mood and Affect: Mood normal.         Speech: Speech normal.         Behavior: Behavior normal.         Thought Content: Thought content normal.         Cognition and Memory: Cognition and memory normal.         Judgment: Judgment normal.         /80   Pulse 78   Temp 97 °F (36.1 °C)   Ht 1.651 m (5' 5\")   Wt 88.9 kg (196 lb)   SpO2 98%   BMI 32.62 kg/m²     Assessment:       ICD-10-CM    1. Acute pain of right shoulder  M25.511 XR SHOULDER RIGHT (MIN 2 VIEWS)      2. Crepitus of right shoulder joint  M24.811 XR SHOULDER RIGHT (MIN 2 VIEWS)     methylPREDNISolone (MEDROL DOSEPACK) 4 MG tablet     diclofenac (VOLTAREN) 50 MG EC tablet      3. Fall, initial encounter  W19.XXXA XR SHOULDER RIGHT (MIN 2 VIEWS)           No results found for this visit on 02/23/24.    Plan:     1. Acute pain of right shoulder    - XR SHOULDER RIGHT (MIN 2 VIEWS); Future    2. Crepitus of right shoulder joint    - XR SHOULDER RIGHT (MIN 2 VIEWS); Future  - methylPREDNISolone (MEDROL DOSEPACK) 4 MG tablet; Take by mouth.  Dispense: 1 kit; Refill: 0  - diclofenac (VOLTAREN) 50 MG EC tablet; Take 1 tablet by mouth 2 times daily  Dispense: 60 tablet; Refill: 0    3. Fall, initial encounter    - XR SHOULDER RIGHT (MIN 2 VIEWS); Future      Starting with xray.  Treating as tendonitis as well.   If no improvement at follow up visit will likely need MRI.  Exercises provided for strengthening as well.      Return in about 18 days (around 3/12/2024), or if symptoms worsen or fail to improve.    Orders Placed This Encounter   Procedures    XR SHOULDER RIGHT (MIN 2 VIEWS)     Standing Status:   Future     Number of Occurrences:   1     Standing

## 2024-02-26 ENCOUNTER — TELEPHONE (OUTPATIENT)
Dept: FAMILY MEDICINE CLINIC | Age: 54
End: 2024-02-26

## 2024-02-26 NOTE — TELEPHONE ENCOUNTER
----- Message from GUY Garnica sent at 2/26/2024  8:52 AM CST -----  Please call patient and let them know results.   Right shoulder x-ray is negative

## 2024-03-13 ENCOUNTER — OFFICE VISIT (OUTPATIENT)
Dept: FAMILY MEDICINE CLINIC | Age: 54
End: 2024-03-13
Payer: COMMERCIAL

## 2024-03-13 VITALS
DIASTOLIC BLOOD PRESSURE: 80 MMHG | BODY MASS INDEX: 32.99 KG/M2 | SYSTOLIC BLOOD PRESSURE: 120 MMHG | OXYGEN SATURATION: 98 % | WEIGHT: 198 LBS | HEART RATE: 68 BPM | HEIGHT: 65 IN | TEMPERATURE: 97.9 F

## 2024-03-13 DIAGNOSIS — M77.8 RIGHT SHOULDER TENDONITIS: Primary | ICD-10-CM

## 2024-03-13 PROCEDURE — 99213 OFFICE O/P EST LOW 20 MIN: CPT | Performed by: NURSE PRACTITIONER

## 2024-03-13 ASSESSMENT — ENCOUNTER SYMPTOMS
RESPIRATORY NEGATIVE: 1
EYES NEGATIVE: 1

## 2024-03-13 NOTE — PROGRESS NOTES
[facility]  [address] [place] [address]  Dept: [phone]  Dept Fax: [phone]  Loc: [phone]    Lizeth Newton is a 54 y.o. female who presents today for her medical conditions/complaints as noted below.  Lizeth Newton is c/o of Follow-up (R shoulder )      Chief Complaint   Patient presents with    Follow-up     R shoulder        HPI:     HPI  Patient presents today for follow up of right shoulder.  She states that she is doing much better.  She has noticed much more range of motion.  She did complete course of steroids.  She has been taking diclofenac.      Past Medical History:   Diagnosis Date    Anxiety     Panic disorder         Past Surgical History:   Procedure Laterality Date    TUBAL LIGATION         Social History     Tobacco Use    Smoking status: Former     Current packs/day: 1.00     Average packs/day: 1 pack/day for 25.0 years (25.0 ttl pk-yrs)     Types: Cigarettes    Smokeless tobacco: Never   Substance Use Topics    Alcohol use: Never        Current Outpatient Medications   Medication Sig Dispense Refill    diclofenac (VOLTAREN) 50 MG EC tablet Take 1 tablet by mouth 2 times daily 60 tablet 0    busPIRone (BUSPAR) 10 MG tablet Take 1 tablet by mouth 3 times daily as needed (anxiety) 270 tablet 1    buPROPion (WELLBUTRIN XL) 300 MG extended release tablet Take 1 tablet by mouth every morning 90 tablet 2    methylPREDNISolone (MEDROL DOSEPACK) 4 MG tablet Take by mouth. (Patient not taking: Reported on 3/13/2024) 1 kit 0     No current facility-administered medications for this visit.       No Known Allergies    Family History   Problem Relation Age of Onset    Cancer Father         lung    Breast Cancer Paternal Aunt              Subjective:      Review of Systems   Constitutional: Negative.    HENT: Negative.     Eyes: Negative.    Respiratory: Negative.     Cardiovascular: Negative.    Endocrine: Negative.    Genitourinary:

## 2024-04-19 DIAGNOSIS — F41.9 ANXIETY: ICD-10-CM

## 2024-04-19 DIAGNOSIS — R45.1 AGITATION: ICD-10-CM

## 2024-04-19 RX ORDER — BUPROPION HYDROCHLORIDE 300 MG/1
300 TABLET ORAL EVERY MORNING
Qty: 90 TABLET | Refills: 2 | Status: SHIPPED | OUTPATIENT
Start: 2024-04-19

## 2024-04-19 NOTE — TELEPHONE ENCOUNTER
Received fax from pharmacy requesting refill on pts medication(s). Pt was last seen in office on 3/13/2024  and has a follow up scheduled for Visit date not found. Will send request to  [facility]  for authorization.     Requested Prescriptions     Pending Prescriptions Disp Refills    buPROPion (WELLBUTRIN XL) 300 MG extended release tablet [Pharmacy Med Name: BUPROPION HCL  MG TABLET] 90 tablet 2     Sig: TAKE 1 TABLET BY MOUTH EVERY DAY IN THE MORNING

## 2024-06-20 ENCOUNTER — HOSPITAL ENCOUNTER (OUTPATIENT)
Dept: WOMENS IMAGING | Age: 54
Discharge: HOME | End: 2024-06-20
Payer: COMMERCIAL

## 2024-06-20 DIAGNOSIS — Z12.31 ENCOUNTER FOR SCREENING MAMMOGRAM FOR MALIGNANT NEOPLASM OF BREAST: ICD-10-CM

## 2024-06-20 PROCEDURE — 77063 BREAST TOMOSYNTHESIS BI: CPT

## 2024-06-21 ENCOUNTER — TELEPHONE (OUTPATIENT)
Dept: FAMILY MEDICINE CLINIC | Age: 54
End: 2024-06-21

## 2024-06-21 NOTE — TELEPHONE ENCOUNTER
----- Message from GUY Garnica sent at 6/20/2024  3:43 PM CDT -----  Please call patient and let them know results.   Mammogram normal. Repeat in one year. Please call if any questions.

## 2024-06-21 NOTE — TELEPHONE ENCOUNTER
Called patient, spoke with: Patient regarding the results of the patients most recent CHARBEL.  I advised Patient of [facility] recommendations.   Patient did voice understanding

## 2024-09-17 ENCOUNTER — OFFICE VISIT (OUTPATIENT)
Dept: FAMILY MEDICINE CLINIC | Age: 54
End: 2024-09-17
Payer: COMMERCIAL

## 2024-09-17 VITALS
HEART RATE: 73 BPM | WEIGHT: 203 LBS | DIASTOLIC BLOOD PRESSURE: 84 MMHG | SYSTOLIC BLOOD PRESSURE: 112 MMHG | RESPIRATION RATE: 17 BRPM | TEMPERATURE: 98.4 F | OXYGEN SATURATION: 99 % | BODY MASS INDEX: 33.78 KG/M2

## 2024-09-17 DIAGNOSIS — R51.9 ACUTE INTRACTABLE HEADACHE, UNSPECIFIED HEADACHE TYPE: Primary | ICD-10-CM

## 2024-09-17 PROCEDURE — 99213 OFFICE O/P EST LOW 20 MIN: CPT | Performed by: CLINICAL NURSE SPECIALIST

## 2024-09-17 PROCEDURE — 96372 THER/PROPH/DIAG INJ SC/IM: CPT | Performed by: CLINICAL NURSE SPECIALIST

## 2024-09-17 RX ORDER — PROMETHAZINE HYDROCHLORIDE 25 MG/1
25 TABLET ORAL 4 TIMES DAILY PRN
Qty: 20 TABLET | Refills: 0 | Status: SHIPPED | OUTPATIENT
Start: 2024-09-17 | End: 2024-09-24

## 2024-09-17 RX ORDER — PROMETHAZINE HYDROCHLORIDE 25 MG/ML
12.5 INJECTION, SOLUTION INTRAMUSCULAR; INTRAVENOUS ONCE
Status: COMPLETED | OUTPATIENT
Start: 2024-09-17 | End: 2024-09-17

## 2024-09-17 RX ORDER — KETOROLAC TROMETHAMINE 30 MG/ML
60 INJECTION, SOLUTION INTRAMUSCULAR; INTRAVENOUS ONCE
Status: COMPLETED | OUTPATIENT
Start: 2024-09-17 | End: 2024-09-17

## 2024-09-17 RX ORDER — KETOROLAC TROMETHAMINE 10 MG/1
10 TABLET, FILM COATED ORAL EVERY 6 HOURS PRN
Qty: 20 TABLET | Refills: 0 | Status: SHIPPED | OUTPATIENT
Start: 2024-09-17 | End: 2025-09-17

## 2024-09-17 RX ADMIN — KETOROLAC TROMETHAMINE 60 MG: 30 INJECTION, SOLUTION INTRAMUSCULAR; INTRAVENOUS at 14:36

## 2024-09-17 RX ADMIN — PROMETHAZINE HYDROCHLORIDE 12.5 MG: 25 INJECTION, SOLUTION INTRAMUSCULAR; INTRAVENOUS at 14:35

## 2024-09-17 SDOH — ECONOMIC STABILITY: INCOME INSECURITY: HOW HARD IS IT FOR YOU TO PAY FOR THE VERY BASICS LIKE FOOD, HOUSING, MEDICAL CARE, AND HEATING?: NOT HARD AT ALL

## 2024-09-17 SDOH — ECONOMIC STABILITY: FOOD INSECURITY: WITHIN THE PAST 12 MONTHS, THE FOOD YOU BOUGHT JUST DIDN'T LAST AND YOU DIDN'T HAVE MONEY TO GET MORE.: NEVER TRUE

## 2024-09-17 SDOH — ECONOMIC STABILITY: FOOD INSECURITY: WITHIN THE PAST 12 MONTHS, YOU WORRIED THAT YOUR FOOD WOULD RUN OUT BEFORE YOU GOT MONEY TO BUY MORE.: NEVER TRUE

## 2024-09-17 ASSESSMENT — ENCOUNTER SYMPTOMS
EYE PAIN: 0
NAUSEA: 1
EYE DISCHARGE: 0
WHEEZING: 0
SINUS PRESSURE: 1
CHEST TIGHTNESS: 0
VOMITING: 0
RHINORRHEA: 0
COUGH: 0
SHORTNESS OF BREATH: 0
FACIAL SWELLING: 0
SORE THROAT: 0

## 2024-09-17 ASSESSMENT — PATIENT HEALTH QUESTIONNAIRE - PHQ9
SUM OF ALL RESPONSES TO PHQ QUESTIONS 1-9: 0
2. FEELING DOWN, DEPRESSED OR HOPELESS: NOT AT ALL
SUM OF ALL RESPONSES TO PHQ9 QUESTIONS 1 & 2: 0
SUM OF ALL RESPONSES TO PHQ QUESTIONS 1-9: 0
1. LITTLE INTEREST OR PLEASURE IN DOING THINGS: NOT AT ALL

## 2024-12-06 ENCOUNTER — OFFICE VISIT (OUTPATIENT)
Dept: FAMILY MEDICINE CLINIC | Age: 54
End: 2024-12-06
Payer: COMMERCIAL

## 2024-12-06 VITALS
HEART RATE: 78 BPM | BODY MASS INDEX: 33.82 KG/M2 | TEMPERATURE: 97.7 F | HEIGHT: 65 IN | SYSTOLIC BLOOD PRESSURE: 122 MMHG | WEIGHT: 203 LBS | OXYGEN SATURATION: 100 % | DIASTOLIC BLOOD PRESSURE: 76 MMHG

## 2024-12-06 DIAGNOSIS — M76.31 IT BAND SYNDROME, RIGHT: Primary | ICD-10-CM

## 2024-12-06 DIAGNOSIS — M25.561 ACUTE PAIN OF RIGHT KNEE: ICD-10-CM

## 2024-12-06 PROCEDURE — 99213 OFFICE O/P EST LOW 20 MIN: CPT | Performed by: NURSE PRACTITIONER

## 2024-12-06 PROCEDURE — 96372 THER/PROPH/DIAG INJ SC/IM: CPT | Performed by: NURSE PRACTITIONER

## 2024-12-06 RX ORDER — KETOROLAC TROMETHAMINE 10 MG/1
10 TABLET, FILM COATED ORAL EVERY 6 HOURS PRN
Qty: 20 TABLET | Refills: 0 | Status: SHIPPED | OUTPATIENT
Start: 2024-12-06 | End: 2025-12-06

## 2024-12-06 RX ORDER — KETOROLAC TROMETHAMINE 30 MG/ML
30 INJECTION, SOLUTION INTRAMUSCULAR; INTRAVENOUS ONCE
Status: COMPLETED | OUTPATIENT
Start: 2024-12-06 | End: 2024-12-06

## 2024-12-06 RX ORDER — PREDNISONE 10 MG/1
10 TABLET ORAL DAILY
Qty: 7 TABLET | Refills: 0 | Status: SHIPPED | OUTPATIENT
Start: 2024-12-06 | End: 2024-12-13

## 2024-12-06 RX ADMIN — KETOROLAC TROMETHAMINE 30 MG: 30 INJECTION, SOLUTION INTRAMUSCULAR; INTRAVENOUS at 09:18

## 2024-12-06 ASSESSMENT — ENCOUNTER SYMPTOMS
RESPIRATORY NEGATIVE: 1
EYES NEGATIVE: 1
GASTROINTESTINAL NEGATIVE: 1

## 2024-12-06 NOTE — PROGRESS NOTES
Lizeth Newton (:  1970) is a 54 y.o. female, Established patient, here for evaluation of the following chief complaint(s):  Shoulder Pain (Right ), Knee Pain (Right), and Hip Pain (Right)         Assessment & Plan  1. Right knee pain.  The patient's symptoms suggest an inflamed right IT band, likely due to an abnormal gait. The presence of a hematoma is also suspected, as evidenced by the bruising extending down the leg. An x-ray is not deemed necessary at this point. She was advised to limit her movements and apply ice to the affected area for the first 3 to 5 days. After this period, she can alternate between heat and ice as needed. A Toradol injection will be administered in the office today, followed by oral Toradol every 6 hours. Additionally, a prescription for steroids (10 mg daily for a week) was provided. A work excuse note was also issued.        Results    1. It band syndrome, right  -     ketorolac (TORADOL) injection 30 mg; 30 mg, IntraMUSCular, ONCE, 1 dose, On 24 at 0930Do not administer for more than 5 days  2. Acute pain of right knee  -     ketorolac (TORADOL) injection 30 mg; 30 mg, IntraMUSCular, ONCE, 1 dose, On 24 at 0930Do not administer for more than 5 days    Return in 6 weeks (on 2025), or if symptoms worsen or fail to improve, for Follow up chronic conditions, with labs.       Subjective   History of Present Illness  The patient presents for evaluation of right knee pain.    She experienced a fall in the bathroom three days ago, during which she struck her right knee and twisted her body. The pain in her right knee was immediate, and she also noticed a bruise on her right knee. Currently, she can stand and walk without pain, but prolonged activity results in discomfort. She reports tenderness under her knee.    She also mentions soreness on the outside of her hip, which she attributes to the fall. Initially, she did not experience any hip pain the

## 2025-01-16 SDOH — ECONOMIC STABILITY: FOOD INSECURITY: WITHIN THE PAST 12 MONTHS, YOU WORRIED THAT YOUR FOOD WOULD RUN OUT BEFORE YOU GOT MONEY TO BUY MORE.: NEVER TRUE

## 2025-01-16 SDOH — ECONOMIC STABILITY: FOOD INSECURITY: WITHIN THE PAST 12 MONTHS, THE FOOD YOU BOUGHT JUST DIDN'T LAST AND YOU DIDN'T HAVE MONEY TO GET MORE.: NEVER TRUE

## 2025-01-16 SDOH — ECONOMIC STABILITY: INCOME INSECURITY: IN THE LAST 12 MONTHS, WAS THERE A TIME WHEN YOU WERE NOT ABLE TO PAY THE MORTGAGE OR RENT ON TIME?: NO

## 2025-01-16 SDOH — ECONOMIC STABILITY: TRANSPORTATION INSECURITY
IN THE PAST 12 MONTHS, HAS THE LACK OF TRANSPORTATION KEPT YOU FROM MEDICAL APPOINTMENTS OR FROM GETTING MEDICATIONS?: NO

## 2025-01-16 ASSESSMENT — PATIENT HEALTH QUESTIONNAIRE - PHQ9
SUM OF ALL RESPONSES TO PHQ QUESTIONS 1-9: 0
SUM OF ALL RESPONSES TO PHQ9 QUESTIONS 1 & 2: 0
2. FEELING DOWN, DEPRESSED OR HOPELESS: NOT AT ALL
SUM OF ALL RESPONSES TO PHQ QUESTIONS 1-9: 0
1. LITTLE INTEREST OR PLEASURE IN DOING THINGS: NOT AT ALL
SUM OF ALL RESPONSES TO PHQ QUESTIONS 1-9: 0
SUM OF ALL RESPONSES TO PHQ QUESTIONS 1-9: 0
1. LITTLE INTEREST OR PLEASURE IN DOING THINGS: NOT AT ALL
2. FEELING DOWN, DEPRESSED OR HOPELESS: NOT AT ALL
SUM OF ALL RESPONSES TO PHQ9 QUESTIONS 1 & 2: 0

## 2025-01-17 ENCOUNTER — OFFICE VISIT (OUTPATIENT)
Age: 55
End: 2025-01-17

## 2025-01-17 VITALS
OXYGEN SATURATION: 98 % | TEMPERATURE: 97 F | DIASTOLIC BLOOD PRESSURE: 82 MMHG | WEIGHT: 205 LBS | BODY MASS INDEX: 34.16 KG/M2 | HEIGHT: 65 IN | SYSTOLIC BLOOD PRESSURE: 126 MMHG | HEART RATE: 74 BPM

## 2025-01-17 DIAGNOSIS — E66.09 CLASS 1 OBESITY DUE TO EXCESS CALORIES WITHOUT SERIOUS COMORBIDITY WITH BODY MASS INDEX (BMI) OF 34.0 TO 34.9 IN ADULT: ICD-10-CM

## 2025-01-17 DIAGNOSIS — F41.9 ANXIETY: ICD-10-CM

## 2025-01-17 DIAGNOSIS — R73.09 ELEVATED GLUCOSE: ICD-10-CM

## 2025-01-17 DIAGNOSIS — E66.811 CLASS 1 OBESITY DUE TO EXCESS CALORIES WITHOUT SERIOUS COMORBIDITY WITH BODY MASS INDEX (BMI) OF 34.0 TO 34.9 IN ADULT: ICD-10-CM

## 2025-01-17 DIAGNOSIS — E55.9 VITAMIN D DEFICIENCY: ICD-10-CM

## 2025-01-17 DIAGNOSIS — Z13.220 LIPID SCREENING: ICD-10-CM

## 2025-01-17 LAB
25(OH)D3 SERPL-MCNC: 17.7 NG/ML
ALBUMIN SERPL-MCNC: 4.4 G/DL (ref 3.5–5.2)
ALP SERPL-CCNC: 109 U/L (ref 35–104)
ALT SERPL-CCNC: 46 U/L (ref 5–33)
ANION GAP SERPL CALCULATED.3IONS-SCNC: 12 MMOL/L (ref 7–19)
AST SERPL-CCNC: 29 U/L (ref 5–32)
BASOPHILS # BLD: 0.1 K/UL (ref 0–0.2)
BASOPHILS NFR BLD: 0.7 % (ref 0–1)
BILIRUB SERPL-MCNC: 0.4 MG/DL (ref 0.2–1.2)
BILIRUB UR STRIP.AUTO-MCNC: NEGATIVE MG/DL
BUN SERPL-MCNC: 9 MG/DL (ref 6–20)
CALCIUM SERPL-MCNC: 9.2 MG/DL (ref 8.6–10)
CHLORIDE SERPL-SCNC: 102 MMOL/L (ref 98–111)
CHOLEST SERPL-MCNC: 187 MG/DL (ref 0–199)
CLARITY UR: ABNORMAL
CO2 SERPL-SCNC: 26 MMOL/L (ref 22–29)
COLOR UR: YELLOW
CREAT SERPL-MCNC: 0.7 MG/DL (ref 0.5–0.9)
CREAT UR-MCNC: 188.6 MG/DL (ref 28–217)
EOSINOPHIL # BLD: 0.2 K/UL (ref 0–0.6)
EOSINOPHIL NFR BLD: 2.6 % (ref 0–5)
ERYTHROCYTE [DISTWIDTH] IN BLOOD BY AUTOMATED COUNT: 13.7 % (ref 11.5–14.5)
GLUCOSE SERPL-MCNC: 105 MG/DL (ref 70–99)
GLUCOSE UR STRIP.AUTO-MCNC: NEGATIVE MG/DL
HBA1C MFR BLD: 5.9 % (ref 4–5.6)
HCT VFR BLD AUTO: 39.2 % (ref 37–47)
HDLC SERPL-MCNC: 36 MG/DL (ref 40–60)
HGB BLD-MCNC: 12.9 G/DL (ref 12–16)
HGB UR STRIP.AUTO-MCNC: NEGATIVE MG/L
IMM GRANULOCYTES # BLD: 0.1 K/UL
KETONES UR STRIP.AUTO-MCNC: NEGATIVE MG/DL
LDLC SERPL CALC-MCNC: 114 MG/DL
LEUKOCYTE ESTERASE UR QL STRIP.AUTO: NEGATIVE
LYMPHOCYTES # BLD: 1.6 K/UL (ref 1.1–4.5)
LYMPHOCYTES NFR BLD: 22.4 % (ref 20–40)
MCH RBC QN AUTO: 29.4 PG (ref 27–31)
MCHC RBC AUTO-ENTMCNC: 32.9 G/DL (ref 33–37)
MCV RBC AUTO: 89.3 FL (ref 81–99)
MICROALBUMIN UR-MCNC: 2.7 MG/DL (ref 0–1.99)
MICROALBUMIN/CREAT UR-RTO: 14.3 MG/G
MONOCYTES # BLD: 0.6 K/UL (ref 0–0.9)
MONOCYTES NFR BLD: 7.6 % (ref 0–10)
NEUTROPHILS # BLD: 4.8 K/UL (ref 1.5–7.5)
NEUTS SEG NFR BLD: 66 % (ref 50–65)
NITRITE UR QL STRIP.AUTO: NEGATIVE
PH UR STRIP.AUTO: 6 [PH] (ref 5–8)
PLATELET # BLD AUTO: 339 K/UL (ref 130–400)
PMV BLD AUTO: 9.2 FL (ref 9.4–12.3)
POTASSIUM SERPL-SCNC: 3.7 MMOL/L (ref 3.5–5)
PROT SERPL-MCNC: 7.8 G/DL (ref 6.4–8.3)
PROT UR STRIP.AUTO-MCNC: NEGATIVE MG/DL
RBC # BLD AUTO: 4.39 M/UL (ref 4.2–5.4)
SODIUM SERPL-SCNC: 140 MMOL/L (ref 136–145)
SP GR UR STRIP.AUTO: 1.02 (ref 1–1.03)
TRIGL SERPL-MCNC: 187 MG/DL (ref 0–149)
TSH SERPL DL<=0.005 MIU/L-ACNC: 2.82 UIU/ML (ref 0.27–4.2)
UROBILINOGEN UR STRIP.AUTO-MCNC: 0.2 E.U./DL
WBC # BLD AUTO: 7.3 K/UL (ref 4.8–10.8)

## 2025-01-17 RX ORDER — TIRZEPATIDE 2.5 MG/.5ML
2.5 INJECTION, SOLUTION SUBCUTANEOUS WEEKLY
Qty: 2 ML | Refills: 1 | Status: SHIPPED | OUTPATIENT
Start: 2025-01-17 | End: 2025-02-16

## 2025-01-17 ASSESSMENT — ENCOUNTER SYMPTOMS
RESPIRATORY NEGATIVE: 1
GASTROINTESTINAL NEGATIVE: 1
EYES NEGATIVE: 1

## 2025-01-17 NOTE — PROGRESS NOTES
Thought content normal.         Cognition and Memory: Cognition normal.         Judgment: Judgment normal.             The patient (or guardian, if applicable) and other individuals in attendance with the patient were advised that Artificial Intelligence will be utilized during this visit to record, process the conversation to generate a clinical note and to support improvement of the AI technology. The patient (or guardian, if applicable) and other individuals in attendance at the appointment consented to the use of AI, including the recording.      An electronic signature was used to authenticate this note.    --GUY Greene

## 2025-01-20 DIAGNOSIS — E55.9 VITAMIN D DEFICIENCY: Primary | ICD-10-CM

## 2025-01-20 RX ORDER — ERGOCALCIFEROL 1.25 MG/1
50000 CAPSULE, LIQUID FILLED ORAL WEEKLY
Qty: 4 CAPSULE | Refills: 0 | Status: SHIPPED | OUTPATIENT
Start: 2025-01-20

## 2025-01-20 NOTE — TELEPHONE ENCOUNTER
----- Message from GUY Tovar sent at 1/20/2025 12:38 PM CST -----  Please call patient and let them know results.   There is mild albumin in the urine.  Is a early sign of kidney disease.  Imperative to control blood pressure, blood sugar, ensure good hydration to avoid worsening  Negative urinalysis  Vitamin D level is low.  Recommend vitamin D3 50,000 units once weekly #4 refill 2  Blood sugars controlled with A1c of 5.9  Your LDL cholesterol is mildly elevated, but within the range of getting it down by diet and exercise.  Metabolic panel is normal except for mild elevated liver enzymes.  Recommend high-fiber low-fat diet and exercise  Normal blood counts  Normal thyroid

## 2025-01-20 NOTE — TELEPHONE ENCOUNTER
Called patient, spoke with: Patient regarding the results of the patients most recent labs.  I advised Patient of [facility] recommendations.   Patient did voice understanding    Medication pending

## 2025-02-10 DIAGNOSIS — E55.9 VITAMIN D DEFICIENCY: ICD-10-CM

## 2025-02-10 NOTE — TELEPHONE ENCOUNTER
Received fax from pharmacy requesting refill on pts medication(s). Pt was last seen in office on 1/17/2025  and has a follow up scheduled for 2/28/2025. Will send request to  [facility]ania [facility]  for authorization.     Requested Prescriptions     Pending Prescriptions Disp Refills    vitamin D (ERGOCALCIFEROL) 1.25 MG (02593 UT) CAPS capsule [Pharmacy Med Name: VITAMIN D2 1.25MG(50,000 UNIT)] 12 capsule 1     Sig: TAKE 1 CAPSULE BY MOUTH ONE TIME PER WEEK

## 2025-02-11 RX ORDER — ERGOCALCIFEROL 1.25 MG/1
50000 CAPSULE, LIQUID FILLED ORAL WEEKLY
Qty: 12 CAPSULE | Refills: 1 | Status: SHIPPED | OUTPATIENT
Start: 2025-02-11

## 2025-02-28 ENCOUNTER — OFFICE VISIT (OUTPATIENT)
Age: 55
End: 2025-02-28

## 2025-02-28 VITALS
SYSTOLIC BLOOD PRESSURE: 124 MMHG | BODY MASS INDEX: 31.65 KG/M2 | HEART RATE: 76 BPM | TEMPERATURE: 97.8 F | HEIGHT: 65 IN | WEIGHT: 190 LBS | DIASTOLIC BLOOD PRESSURE: 80 MMHG | OXYGEN SATURATION: 98 %

## 2025-02-28 DIAGNOSIS — E66.811 CLASS 1 OBESITY DUE TO EXCESS CALORIES WITHOUT SERIOUS COMORBIDITY WITH BODY MASS INDEX (BMI) OF 31.0 TO 31.9 IN ADULT: ICD-10-CM

## 2025-02-28 DIAGNOSIS — E66.09 CLASS 1 OBESITY DUE TO EXCESS CALORIES WITHOUT SERIOUS COMORBIDITY WITH BODY MASS INDEX (BMI) OF 31.0 TO 31.9 IN ADULT: ICD-10-CM

## 2025-02-28 ASSESSMENT — ENCOUNTER SYMPTOMS
EYES NEGATIVE: 1
GASTROINTESTINAL NEGATIVE: 1
RESPIRATORY NEGATIVE: 1

## 2025-02-28 NOTE — PROGRESS NOTES
AI, including the recording.      An electronic signature was used to authenticate this note.    --GUY Greene

## 2025-03-07 ENCOUNTER — OFFICE VISIT (OUTPATIENT)
Age: 55
End: 2025-03-07
Payer: COMMERCIAL

## 2025-03-07 VITALS
SYSTOLIC BLOOD PRESSURE: 126 MMHG | WEIGHT: 188 LBS | HEART RATE: 80 BPM | DIASTOLIC BLOOD PRESSURE: 82 MMHG | BODY MASS INDEX: 31.32 KG/M2 | HEIGHT: 65 IN | TEMPERATURE: 97.8 F | OXYGEN SATURATION: 97 %

## 2025-03-07 DIAGNOSIS — R30.0 DYSURIA: ICD-10-CM

## 2025-03-07 DIAGNOSIS — N30.01 ACUTE CYSTITIS WITH HEMATURIA: Primary | ICD-10-CM

## 2025-03-07 LAB
APPEARANCE FLUID: ABNORMAL
BILIRUBIN, POC: ABNORMAL
BLOOD URINE, POC: ABNORMAL
CLARITY, POC: ABNORMAL
COLOR, POC: YELLOW
GLUCOSE URINE, POC: ABNORMAL MG/DL
KETONES, POC: ABNORMAL MG/DL
LEUKOCYTE EST, POC: ABNORMAL
NITRITE, POC: ABNORMAL
PH, POC: 6.5
PROTEIN, POC: 30 MG/DL
SPECIFIC GRAVITY, POC: 1.01
UROBILINOGEN, POC: 0.2 MG/DL

## 2025-03-07 PROCEDURE — 99213 OFFICE O/P EST LOW 20 MIN: CPT | Performed by: NURSE PRACTITIONER

## 2025-03-07 PROCEDURE — 81002 URINALYSIS NONAUTO W/O SCOPE: CPT | Performed by: NURSE PRACTITIONER

## 2025-03-07 RX ORDER — NITROFURANTOIN 25; 75 MG/1; MG/1
100 CAPSULE ORAL 2 TIMES DAILY
Qty: 20 CAPSULE | Refills: 0 | Status: SHIPPED | OUTPATIENT
Start: 2025-03-07 | End: 2025-03-17

## 2025-03-07 RX ORDER — PHENAZOPYRIDINE HYDROCHLORIDE 100 MG/1
100 TABLET, FILM COATED ORAL 3 TIMES DAILY PRN
Qty: 9 TABLET | Refills: 0 | Status: SHIPPED | OUTPATIENT
Start: 2025-03-07 | End: 2026-03-07

## 2025-03-07 ASSESSMENT — ENCOUNTER SYMPTOMS
GASTROINTESTINAL NEGATIVE: 1
EYES NEGATIVE: 1
RESPIRATORY NEGATIVE: 1

## 2025-03-07 NOTE — PROGRESS NOTES
Content: Thought content normal.         Judgment: Judgment normal.             The patient (or guardian, if applicable) and other individuals in attendance with the patient were advised that Artificial Intelligence will be utilized during this visit to record, process the conversation to generate a clinical note and to support improvement of the AI technology. The patient (or guardian, if applicable) and other individuals in attendance at the appointment consented to the use of AI, including the recording.      An electronic signature was used to authenticate this note.    --GUY Greene

## 2025-07-31 DIAGNOSIS — E55.9 VITAMIN D DEFICIENCY: ICD-10-CM

## 2025-07-31 RX ORDER — ERGOCALCIFEROL 1.25 MG/1
50000 CAPSULE, LIQUID FILLED ORAL WEEKLY
Qty: 12 CAPSULE | Refills: 1 | Status: SHIPPED | OUTPATIENT
Start: 2025-07-31

## 2025-07-31 NOTE — TELEPHONE ENCOUNTER
Received fax from pharmacy requesting refill on pts medication(s). Pt was last seen in office on 3/7/2025  and has a follow up scheduled for Visit date not found. Will send request to  [facility]ania [facility]que  for authorization.     Requested Prescriptions     Pending Prescriptions Disp Refills    vitamin D (ERGOCALCIFEROL) 1.25 MG (86779 UT) CAPS capsule [Pharmacy Med Name: VITAMIN D2 1.25MG(50,000 UNIT)] 12 capsule 1     Sig: TAKE 1 CAPSULE BY MOUTH ONE TIME PER WEEK